# Patient Record
Sex: FEMALE | Race: OTHER | HISPANIC OR LATINO
[De-identification: names, ages, dates, MRNs, and addresses within clinical notes are randomized per-mention and may not be internally consistent; named-entity substitution may affect disease eponyms.]

---

## 2017-08-11 ENCOUNTER — APPOINTMENT (OUTPATIENT)
Dept: INTERNAL MEDICINE | Facility: CLINIC | Age: 62
End: 2017-08-11
Payer: COMMERCIAL

## 2017-08-11 VITALS
DIASTOLIC BLOOD PRESSURE: 98 MMHG | BODY MASS INDEX: 32.99 KG/M2 | HEIGHT: 65 IN | TEMPERATURE: 98.6 F | RESPIRATION RATE: 12 BRPM | WEIGHT: 198 LBS | OXYGEN SATURATION: 97 % | SYSTOLIC BLOOD PRESSURE: 162 MMHG | HEART RATE: 80 BPM

## 2017-08-11 DIAGNOSIS — Z78.9 OTHER SPECIFIED HEALTH STATUS: ICD-10-CM

## 2017-08-11 PROCEDURE — 36415 COLL VENOUS BLD VENIPUNCTURE: CPT

## 2017-08-11 PROCEDURE — 99386 PREV VISIT NEW AGE 40-64: CPT | Mod: 25

## 2017-08-11 PROCEDURE — 93000 ELECTROCARDIOGRAM COMPLETE: CPT

## 2017-08-12 LAB
ALBUMIN SERPL ELPH-MCNC: 4.4 G/DL
ALP BLD-CCNC: 88 U/L
ALT SERPL-CCNC: 18 U/L
ANION GAP SERPL CALC-SCNC: 15 MMOL/L
APPEARANCE: CLEAR
AST SERPL-CCNC: 13 U/L
BASOPHILS # BLD AUTO: 0.03 K/UL
BASOPHILS NFR BLD AUTO: 0.6 %
BILIRUB SERPL-MCNC: 0.4 MG/DL
BILIRUBIN URINE: NEGATIVE
BLOOD URINE: NEGATIVE
BUN SERPL-MCNC: 15 MG/DL
CALCIUM SERPL-MCNC: 9.6 MG/DL
CHLORIDE SERPL-SCNC: 106 MMOL/L
CHOLEST SERPL-MCNC: 254 MG/DL
CHOLEST/HDLC SERPL: 2.6 RATIO
CO2 SERPL-SCNC: 22 MMOL/L
COLOR: YELLOW
CREAT SERPL-MCNC: 0.81 MG/DL
EOSINOPHIL # BLD AUTO: 0.09 K/UL
EOSINOPHIL NFR BLD AUTO: 1.8 %
GLUCOSE QUALITATIVE U: NORMAL MG/DL
GLUCOSE SERPL-MCNC: 92 MG/DL
HBA1C MFR BLD HPLC: 5.5 %
HCT VFR BLD CALC: 47.9 %
HDLC SERPL-MCNC: 97 MG/DL
HGB BLD-MCNC: 15.3 G/DL
IMM GRANULOCYTES NFR BLD AUTO: 0 %
KETONES URINE: NEGATIVE
LDLC SERPL CALC-MCNC: 136 MG/DL
LEUKOCYTE ESTERASE URINE: NEGATIVE
LYMPHOCYTES # BLD AUTO: 1.94 K/UL
LYMPHOCYTES NFR BLD AUTO: 38.5 %
MAN DIFF?: NORMAL
MCHC RBC-ENTMCNC: 29.7 PG
MCHC RBC-ENTMCNC: 31.9 GM/DL
MCV RBC AUTO: 92.8 FL
MONOCYTES # BLD AUTO: 0.24 K/UL
MONOCYTES NFR BLD AUTO: 4.8 %
NEUTROPHILS # BLD AUTO: 2.74 K/UL
NEUTROPHILS NFR BLD AUTO: 54.3 %
NITRITE URINE: NEGATIVE
PH URINE: 5.5
PLATELET # BLD AUTO: 219 K/UL
POTASSIUM SERPL-SCNC: 4.5 MMOL/L
PROT SERPL-MCNC: 7 G/DL
PROTEIN URINE: NEGATIVE MG/DL
RBC # BLD: 5.16 M/UL
RBC # FLD: 14 %
SODIUM SERPL-SCNC: 143 MMOL/L
SPECIFIC GRAVITY URINE: 1.02
TRIGL SERPL-MCNC: 104 MG/DL
TSH SERPL-ACNC: 1.51 UIU/ML
UROBILINOGEN URINE: NORMAL MG/DL
WBC # FLD AUTO: 5.04 K/UL

## 2017-08-14 ENCOUNTER — FORM ENCOUNTER (OUTPATIENT)
Age: 62
End: 2017-08-14

## 2017-08-15 ENCOUNTER — OUTPATIENT (OUTPATIENT)
Dept: OUTPATIENT SERVICES | Facility: HOSPITAL | Age: 62
LOS: 1 days | End: 2017-08-15
Payer: COMMERCIAL

## 2017-08-15 PROCEDURE — 72040 X-RAY EXAM NECK SPINE 2-3 VW: CPT

## 2017-08-15 PROCEDURE — 72040 X-RAY EXAM NECK SPINE 2-3 VW: CPT | Mod: 26

## 2017-08-29 ENCOUNTER — MESSAGE (OUTPATIENT)
Age: 62
End: 2017-08-29

## 2018-08-29 ENCOUNTER — APPOINTMENT (OUTPATIENT)
Dept: INTERNAL MEDICINE | Facility: CLINIC | Age: 63
End: 2018-08-29
Payer: COMMERCIAL

## 2018-08-29 VITALS
OXYGEN SATURATION: 97 % | WEIGHT: 200 LBS | HEART RATE: 74 BPM | TEMPERATURE: 98.9 F | BODY MASS INDEX: 33.32 KG/M2 | SYSTOLIC BLOOD PRESSURE: 140 MMHG | DIASTOLIC BLOOD PRESSURE: 70 MMHG | HEIGHT: 65 IN | RESPIRATION RATE: 12 BRPM

## 2018-08-29 DIAGNOSIS — M54.2 CERVICALGIA: ICD-10-CM

## 2018-08-29 DIAGNOSIS — Z87.09 PERSONAL HISTORY OF OTHER DISEASES OF THE RESPIRATORY SYSTEM: ICD-10-CM

## 2018-08-29 PROCEDURE — 36415 COLL VENOUS BLD VENIPUNCTURE: CPT

## 2018-08-29 PROCEDURE — 99396 PREV VISIT EST AGE 40-64: CPT | Mod: 25

## 2018-08-30 PROBLEM — M54.2 NECK PAIN: Status: ACTIVE | Noted: 2018-08-30

## 2018-08-30 LAB
ALBUMIN SERPL ELPH-MCNC: 4.6 G/DL
ALP BLD-CCNC: 83 U/L
ALT SERPL-CCNC: 20 U/L
ANION GAP SERPL CALC-SCNC: 14 MMOL/L
APPEARANCE: CLEAR
APTT BLD: 33.9 SEC
AST SERPL-CCNC: 17 U/L
BASOPHILS # BLD AUTO: 0.03 K/UL
BASOPHILS NFR BLD AUTO: 0.5 %
BILIRUB SERPL-MCNC: 0.5 MG/DL
BILIRUBIN URINE: NEGATIVE
BLOOD URINE: NEGATIVE
BUN SERPL-MCNC: 16 MG/DL
CALCIUM SERPL-MCNC: 9.5 MG/DL
CHLORIDE SERPL-SCNC: 107 MMOL/L
CHOLEST SERPL-MCNC: 237 MG/DL
CHOLEST/HDLC SERPL: 2.5 RATIO
CO2 SERPL-SCNC: 23 MMOL/L
COLOR: YELLOW
CREAT SERPL-MCNC: 0.78 MG/DL
EOSINOPHIL # BLD AUTO: 0.08 K/UL
EOSINOPHIL NFR BLD AUTO: 1.4 %
GLUCOSE QUALITATIVE U: NEGATIVE MG/DL
GLUCOSE SERPL-MCNC: 94 MG/DL
HBA1C MFR BLD HPLC: 5.5 %
HCT VFR BLD CALC: 50 %
HDLC SERPL-MCNC: 93 MG/DL
HGB BLD-MCNC: 15.9 G/DL
IMM GRANULOCYTES NFR BLD AUTO: 0.2 %
INR PPP: 0.96 RATIO
KETONES URINE: NEGATIVE
LDLC SERPL CALC-MCNC: 122 MG/DL
LEUKOCYTE ESTERASE URINE: NEGATIVE
LYMPHOCYTES # BLD AUTO: 2.03 K/UL
LYMPHOCYTES NFR BLD AUTO: 34.5 %
MAN DIFF?: NORMAL
MCHC RBC-ENTMCNC: 30.2 PG
MCHC RBC-ENTMCNC: 31.8 GM/DL
MCV RBC AUTO: 95.1 FL
MONOCYTES # BLD AUTO: 0.28 K/UL
MONOCYTES NFR BLD AUTO: 4.8 %
NEUTROPHILS # BLD AUTO: 3.46 K/UL
NEUTROPHILS NFR BLD AUTO: 58.6 %
NITRITE URINE: NEGATIVE
PH URINE: 5
PLATELET # BLD AUTO: 231 K/UL
POTASSIUM SERPL-SCNC: 4.5 MMOL/L
PROT SERPL-MCNC: 7.1 G/DL
PROTEIN URINE: NEGATIVE MG/DL
PT BLD: 10.8 SEC
RBC # BLD: 5.26 M/UL
RBC # FLD: 14.3 %
SODIUM SERPL-SCNC: 144 MMOL/L
SPECIFIC GRAVITY URINE: 1.02
TRIGL SERPL-MCNC: 112 MG/DL
TSH SERPL-ACNC: 1.51 UIU/ML
UROBILINOGEN URINE: NEGATIVE MG/DL
WBC # FLD AUTO: 5.89 K/UL

## 2018-08-30 NOTE — HISTORY OF PRESENT ILLNESS
[FreeTextEntry1] : wellness [de-identified] : 7 questions\par 1- sore throat- for 3-4 months food is getting stuck.\par 2- shoulder and neck are inflamed - and getting headaches every day.\par 3- leg swelling-  comes and goes\par 4- concerned about inflammation at her neck.  \par 5- [place] and it is still hurting. - has not been to a dentist here.\par 6- She has eye issues little black spots floating\par 7- has random bruising on her body\par 8-pain on heel right sisde.

## 2018-08-30 NOTE — PHYSICAL EXAM
[No Acute Distress] : no acute distress [Well Nourished] : well nourished [Well Developed] : well developed [Well-Appearing] : well-appearing [Normal Sclera/Conjunctiva] : normal sclera/conjunctiva [PERRL] : pupils equal round and reactive to light [EOMI] : extraocular movements intact [Normal Outer Ear/Nose] : the outer ears and nose were normal in appearance [Normal Oropharynx] : the oropharynx was normal [No JVD] : no jugular venous distention [Supple] : supple [No Lymphadenopathy] : no lymphadenopathy [Thyroid Normal, No Nodules] : the thyroid was normal and there were no nodules present [No Respiratory Distress] : no respiratory distress  [Clear to Auscultation] : lungs were clear to auscultation bilaterally [No Accessory Muscle Use] : no accessory muscle use [Normal Rate] : normal rate  [Regular Rhythm] : with a regular rhythm [Normal S1, S2] : normal S1 and S2 [No Murmur] : no murmur heard [No Carotid Bruits] : no carotid bruits [Soft] : abdomen soft [Non Tender] : non-tender [Non-distended] : non-distended [No Masses] : no abdominal mass palpated [No HSM] : no HSM [Normal Bowel Sounds] : normal bowel sounds [Normal Posterior Cervical Nodes] : no posterior cervical lymphadenopathy [Normal Anterior Cervical Nodes] : no anterior cervical lymphadenopathy [No CVA Tenderness] : no CVA  tenderness [No Spinal Tenderness] : no spinal tenderness [No Joint Swelling] : no joint swelling [Grossly Normal Strength/Tone] : grossly normal strength/tone [No Rash] : no rash [Normal Gait] : normal gait [Coordination Grossly Intact] : coordination grossly intact [No Focal Deficits] : no focal deficits [Deep Tendon Reflexes (DTR)] : deep tendon reflexes were 2+ and symmetric [Normal Affect] : the affect was normal [Normal Insight/Judgement] : insight and judgment were intact [de-identified] : trace edema b/l feet

## 2018-08-30 NOTE — ASSESSMENT
[FreeTextEntry1] : BP - elevated today - f/up 4 weeks to repeat, continue lisinopril 10mg for now\par Swallowing discomfort- send for ENT eval\par Neck pain / shoulder pain - muscular - warm compress and stretching and massage - PT if no sig improvement\par Ecchymoses- check cbc pt,ptt with labs today\par f/up one month - \par

## 2018-09-07 ENCOUNTER — APPOINTMENT (OUTPATIENT)
Dept: HEART AND VASCULAR | Facility: CLINIC | Age: 63
End: 2018-09-07
Payer: COMMERCIAL

## 2018-09-07 VITALS
HEART RATE: 76 BPM | BODY MASS INDEX: 33.32 KG/M2 | DIASTOLIC BLOOD PRESSURE: 90 MMHG | SYSTOLIC BLOOD PRESSURE: 180 MMHG | HEIGHT: 65 IN | WEIGHT: 200 LBS | OXYGEN SATURATION: 97 % | RESPIRATION RATE: 12 BRPM | TEMPERATURE: 98.2 F

## 2018-09-07 DIAGNOSIS — R06.02 SHORTNESS OF BREATH: ICD-10-CM

## 2018-09-07 PROCEDURE — 93306 TTE W/DOPPLER COMPLETE: CPT

## 2018-09-07 PROCEDURE — 93015 CV STRESS TEST SUPVJ I&R: CPT

## 2018-09-07 PROCEDURE — 93880 EXTRACRANIAL BILAT STUDY: CPT

## 2018-09-07 PROCEDURE — 99214 OFFICE O/P EST MOD 30 MIN: CPT | Mod: 25

## 2018-09-13 ENCOUNTER — APPOINTMENT (OUTPATIENT)
Dept: OTOLARYNGOLOGY | Facility: CLINIC | Age: 63
End: 2018-09-13
Payer: COMMERCIAL

## 2018-09-13 ENCOUNTER — FORM ENCOUNTER (OUTPATIENT)
Age: 63
End: 2018-09-13

## 2018-09-13 VITALS
SYSTOLIC BLOOD PRESSURE: 161 MMHG | TEMPERATURE: 98.9 F | RESPIRATION RATE: 16 BRPM | OXYGEN SATURATION: 98 % | DIASTOLIC BLOOD PRESSURE: 71 MMHG | HEART RATE: 64 BPM

## 2018-09-13 VITALS — SYSTOLIC BLOOD PRESSURE: 160 MMHG | DIASTOLIC BLOOD PRESSURE: 100 MMHG

## 2018-09-13 DIAGNOSIS — J35.8 OTHER CHRONIC DISEASES OF TONSILS AND ADENOIDS: ICD-10-CM

## 2018-09-13 DIAGNOSIS — J35.1 HYPERTROPHY OF TONSILS: ICD-10-CM

## 2018-09-13 DIAGNOSIS — Z86.79 PERSONAL HISTORY OF OTHER DISEASES OF THE CIRCULATORY SYSTEM: ICD-10-CM

## 2018-09-13 DIAGNOSIS — R07.0 PAIN IN THROAT: ICD-10-CM

## 2018-09-13 DIAGNOSIS — R09.81 NASAL CONGESTION: ICD-10-CM

## 2018-09-13 DIAGNOSIS — Z78.9 OTHER SPECIFIED HEALTH STATUS: ICD-10-CM

## 2018-09-13 PROCEDURE — 99203 OFFICE O/P NEW LOW 30 MIN: CPT | Mod: 25

## 2018-09-13 PROCEDURE — 31575 DIAGNOSTIC LARYNGOSCOPY: CPT

## 2018-09-13 RX ORDER — OMEPRAZOLE 40 MG/1
40 CAPSULE, DELAYED RELEASE ORAL
Qty: 1 | Refills: 6 | Status: ACTIVE | COMMUNITY
Start: 2018-09-13 | End: 1900-01-01

## 2018-09-13 RX ORDER — METHYLPREDNISOLONE 4 MG/1
4 TABLET ORAL
Qty: 1 | Refills: 0 | Status: ACTIVE | COMMUNITY
Start: 2018-09-13 | End: 1900-01-01

## 2018-09-14 ENCOUNTER — APPOINTMENT (OUTPATIENT)
Dept: ULTRASOUND IMAGING | Facility: HOSPITAL | Age: 63
End: 2018-09-14
Payer: COMMERCIAL

## 2018-09-14 ENCOUNTER — OUTPATIENT (OUTPATIENT)
Dept: OUTPATIENT SERVICES | Facility: HOSPITAL | Age: 63
LOS: 1 days | End: 2018-09-14
Payer: COMMERCIAL

## 2018-09-14 PROCEDURE — 76536 US EXAM OF HEAD AND NECK: CPT | Mod: 26

## 2018-09-14 PROCEDURE — 76536 US EXAM OF HEAD AND NECK: CPT

## 2018-10-10 ENCOUNTER — APPOINTMENT (OUTPATIENT)
Dept: INTERNAL MEDICINE | Facility: CLINIC | Age: 63
End: 2018-10-10
Payer: COMMERCIAL

## 2018-10-10 VITALS
HEART RATE: 65 BPM | OXYGEN SATURATION: 97 % | DIASTOLIC BLOOD PRESSURE: 115 MMHG | SYSTOLIC BLOOD PRESSURE: 170 MMHG | RESPIRATION RATE: 16 BRPM | TEMPERATURE: 99 F

## 2018-10-10 PROCEDURE — 99214 OFFICE O/P EST MOD 30 MIN: CPT

## 2018-10-10 NOTE — PLAN
[FreeTextEntry1] : change to lis20/hctz 25mg and f/up in 2-3 weeks for b check and labs\par GERD- continue PPI for one month and monitor symptoms continue to avoid highly acidic foods.    if symptoms persist may need PPI for a longer peiod of time\par

## 2018-10-10 NOTE — HISTORY OF PRESENT ILLNESS
[FreeTextEntry1] : elevtaed BP [de-identified] : She is s/p eval from Dr Lai - (no findings and Dr Wong - given solumedrol for inflammation and PPI, \par \par

## 2018-10-10 NOTE — REVIEW OF SYSTEMS
[Nasal Discharge] : nasal discharge [Heartburn] : heartburn [Negative] : Psychiatric [FreeTextEntry9] : opccaisonal neck pain

## 2018-11-07 ENCOUNTER — APPOINTMENT (OUTPATIENT)
Dept: INTERNAL MEDICINE | Facility: CLINIC | Age: 63
End: 2018-11-07
Payer: COMMERCIAL

## 2018-11-07 ENCOUNTER — LABORATORY RESULT (OUTPATIENT)
Age: 63
End: 2018-11-07

## 2018-11-07 VITALS
SYSTOLIC BLOOD PRESSURE: 120 MMHG | TEMPERATURE: 98.6 F | BODY MASS INDEX: 32.65 KG/M2 | RESPIRATION RATE: 14 BRPM | HEART RATE: 71 BPM | DIASTOLIC BLOOD PRESSURE: 80 MMHG | WEIGHT: 196 LBS | HEIGHT: 65 IN | OXYGEN SATURATION: 96 %

## 2018-11-07 DIAGNOSIS — A60.00 HERPESVIRAL INFECTION OF UROGENITAL SYSTEM, UNSPECIFIED: ICD-10-CM

## 2018-11-07 PROCEDURE — 36415 COLL VENOUS BLD VENIPUNCTURE: CPT

## 2018-11-07 PROCEDURE — 99214 OFFICE O/P EST MOD 30 MIN: CPT

## 2018-11-07 RX ORDER — VALACYCLOVIR 500 MG/1
500 TABLET, FILM COATED ORAL
Qty: 90 | Refills: 3 | Status: ACTIVE | COMMUNITY
Start: 2017-08-11 | End: 1900-01-01

## 2018-11-07 NOTE — PLAN
[FreeTextEntry1] : HTN much better controlled\par \par continue cureent medication regimen\par  check labs with thiazide\par \par HSV - refill valacyclovir and also send for ointment to see if it helps\par \par refused flu shot\par \par

## 2018-11-07 NOTE — HISTORY OF PRESENT ILLNESS
[FreeTextEntry1] : bp fllow up [de-identified] : Tkaing the lis/hctz  and tolerating it well.  \par \par Would like refill of valacyclovir for hsv outbreaks

## 2018-11-08 LAB
ANION GAP SERPL CALC-SCNC: 15 MMOL/L
BUN SERPL-MCNC: 29 MG/DL
CALCIUM SERPL-MCNC: 9.8 MG/DL
CHLORIDE SERPL-SCNC: 104 MMOL/L
CO2 SERPL-SCNC: 25 MMOL/L
CREAT SERPL-MCNC: 0.98 MG/DL
GLUCOSE SERPL-MCNC: 97 MG/DL
MAGNESIUM SERPL-MCNC: 2.1 MG/DL
POTASSIUM SERPL-SCNC: 3.8 MMOL/L
SODIUM SERPL-SCNC: 144 MMOL/L

## 2018-12-09 ENCOUNTER — FORM ENCOUNTER (OUTPATIENT)
Age: 63
End: 2018-12-09

## 2018-12-10 ENCOUNTER — APPOINTMENT (OUTPATIENT)
Dept: INTERNAL MEDICINE | Facility: CLINIC | Age: 63
End: 2018-12-10
Payer: COMMERCIAL

## 2018-12-10 ENCOUNTER — OUTPATIENT (OUTPATIENT)
Dept: OUTPATIENT SERVICES | Facility: HOSPITAL | Age: 63
LOS: 1 days | End: 2018-12-10
Payer: COMMERCIAL

## 2018-12-10 VITALS
TEMPERATURE: 97.8 F | DIASTOLIC BLOOD PRESSURE: 78 MMHG | BODY MASS INDEX: 32.45 KG/M2 | SYSTOLIC BLOOD PRESSURE: 120 MMHG | WEIGHT: 195 LBS | OXYGEN SATURATION: 96 % | HEART RATE: 80 BPM | RESPIRATION RATE: 14 BRPM

## 2018-12-10 DIAGNOSIS — R23.3 SPONTANEOUS ECCHYMOSES: ICD-10-CM

## 2018-12-10 PROCEDURE — 73562 X-RAY EXAM OF KNEE 3: CPT | Mod: 26,LT

## 2018-12-10 PROCEDURE — 99214 OFFICE O/P EST MOD 30 MIN: CPT

## 2018-12-10 PROCEDURE — 36415 COLL VENOUS BLD VENIPUNCTURE: CPT

## 2018-12-10 PROCEDURE — 73562 X-RAY EXAM OF KNEE 3: CPT

## 2018-12-10 RX ORDER — DICLOFENAC SODIUM 50 MG/1
50 TABLET, DELAYED RELEASE ORAL
Qty: 10 | Refills: 3 | Status: ACTIVE | COMMUNITY
Start: 2018-12-10 | End: 1900-01-01

## 2018-12-10 NOTE — HISTORY OF PRESENT ILLNESS
[FreeTextEntry1] : left knee pain [de-identified] : having left knee pain for one week.  Severe.  \par \par Also recently had surgery in her mouth  right side tooth  - now with bruising - has been 2 weeks  \par \par Also with a rash over left knee for 2+ weeks, not itching

## 2018-12-10 NOTE — PLAN
[FreeTextEntry1] : \par \par Knee pain send for xray - and pending results sadaf need ortho eval\par \par petechial rash - etiology unclear- check a CCB\par \par \par

## 2018-12-10 NOTE — PHYSICAL EXAM
[No Acute Distress] : no acute distress [Well Nourished] : well nourished [Well Developed] : well developed [de-identified] : mild left knee td, over tib [de-identified] : non blacnhing petechial rash

## 2018-12-11 LAB
BASOPHILS # BLD AUTO: 0.05 K/UL
BASOPHILS NFR BLD AUTO: 0.8 %
EOSINOPHIL # BLD AUTO: 0.09 K/UL
EOSINOPHIL NFR BLD AUTO: 1.4 %
HCT VFR BLD CALC: 47.1 %
HGB BLD-MCNC: 14.9 G/DL
IMM GRANULOCYTES NFR BLD AUTO: 0.2 %
LYMPHOCYTES # BLD AUTO: 2.04 K/UL
LYMPHOCYTES NFR BLD AUTO: 30.9 %
MAN DIFF?: NORMAL
MCHC RBC-ENTMCNC: 30.2 PG
MCHC RBC-ENTMCNC: 31.6 GM/DL
MCV RBC AUTO: 95.5 FL
MONOCYTES # BLD AUTO: 0.25 K/UL
MONOCYTES NFR BLD AUTO: 3.8 %
NEUTROPHILS # BLD AUTO: 4.16 K/UL
NEUTROPHILS NFR BLD AUTO: 62.9 %
PLATELET # BLD AUTO: 265 K/UL
RBC # BLD: 4.93 M/UL
RBC # FLD: 14.3 %
WBC # FLD AUTO: 6.6 K/UL

## 2018-12-18 ENCOUNTER — APPOINTMENT (OUTPATIENT)
Dept: ORTHOPEDIC SURGERY | Facility: CLINIC | Age: 63
End: 2018-12-18
Payer: COMMERCIAL

## 2018-12-18 PROCEDURE — 99203 OFFICE O/P NEW LOW 30 MIN: CPT

## 2018-12-18 RX ORDER — MELOXICAM 15 MG/1
15 TABLET ORAL
Qty: 30 | Refills: 2 | Status: ACTIVE | COMMUNITY
Start: 2018-12-18 | End: 1900-01-01

## 2019-01-09 ENCOUNTER — APPOINTMENT (OUTPATIENT)
Dept: ORTHOPEDIC SURGERY | Facility: CLINIC | Age: 64
End: 2019-01-09
Payer: COMMERCIAL

## 2019-01-09 PROCEDURE — 99213 OFFICE O/P EST LOW 20 MIN: CPT

## 2019-01-09 NOTE — ASSESSMENT
[FreeTextEntry1] : Assessment\par #1 internal derangement left knee, medial meniscus tear, osteochondral defect femoral trochlea.\par \par Plan\par #1 for this patient who has severe pain with walking and sleeve along with mechanical symptoms we are recommending surgical intervention to include an arthroscopy and OATS procedure. We would recommend arthroscopy initially to address the meniscus tear and visualized the joint and articular surfaces, if deemed necessary at that point then we would convert to a mini-open procedure with an OATS to address the cartilage defect. Explained to the patient that she does not necessary have to do the procedure, but this may lead to increased pain and advanced early arthritis, this procedure may help to delay the onset of arthritis. Risks benefits alternatives clearly explained in detail. Patient will discuss with her family and call us to let us know she wishes to proceed.

## 2019-01-09 NOTE — PHYSICAL EXAM
[de-identified] : Focused examinatino of left knee: Red superficial abrasion over the proximal anterior tibia. Mildly swollen. Tender laterally. ROM 0-125. AP stable, V/V lax, (+) Esther Test. (-) Lochman test. Ambulates with normal gait without devices. [de-identified] : MRI from [facility] shows a horizontal nondisplaced tear of the medial meniscus and a small osteochondral defect of the femoral trochlea, there are mild degenerative changes of the medial and patellofemoral compartment.

## 2019-01-09 NOTE — HISTORY OF PRESENT ILLNESS
[de-identified] : Following up for MRI results, has had an overall very slight decrease in her pain, but her pain continues to be severe when she is walking and sleeping. Mechanical symptoms remain present. No other complaints or changes in her status at this time.

## 2019-01-25 ENCOUNTER — APPOINTMENT (OUTPATIENT)
Dept: INTERNAL MEDICINE | Facility: CLINIC | Age: 64
End: 2019-01-25
Payer: COMMERCIAL

## 2019-01-25 VITALS
SYSTOLIC BLOOD PRESSURE: 140 MMHG | WEIGHT: 195 LBS | OXYGEN SATURATION: 98 % | HEART RATE: 78 BPM | HEIGHT: 65 IN | DIASTOLIC BLOOD PRESSURE: 80 MMHG | BODY MASS INDEX: 32.49 KG/M2 | RESPIRATION RATE: 14 BRPM | TEMPERATURE: 98.4 F

## 2019-01-25 DIAGNOSIS — K21.9 GASTRO-ESOPHAGEAL REFLUX DISEASE W/OUT ESOPHAGITIS: ICD-10-CM

## 2019-01-25 DIAGNOSIS — Z01.818 ENCOUNTER FOR OTHER PREPROCEDURAL EXAMINATION: ICD-10-CM

## 2019-01-25 PROCEDURE — 99214 OFFICE O/P EST MOD 30 MIN: CPT

## 2019-01-25 PROCEDURE — 36415 COLL VENOUS BLD VENIPUNCTURE: CPT

## 2019-01-25 PROCEDURE — 93000 ELECTROCARDIOGRAM COMPLETE: CPT

## 2019-01-27 PROBLEM — K21.9 CHRONIC GERD: Status: ACTIVE | Noted: 2018-09-13

## 2019-01-28 PROBLEM — Z01.818 PRE-OP EXAM: Status: ACTIVE | Noted: 2019-01-28

## 2019-01-28 LAB
ALBUMIN SERPL ELPH-MCNC: 4.5 G/DL
ALP BLD-CCNC: 76 U/L
ALT SERPL-CCNC: 24 U/L
ANION GAP SERPL CALC-SCNC: 14 MMOL/L
APPEARANCE: CLEAR
APTT BLD: 32.4 SEC
AST SERPL-CCNC: 19 U/L
BASOPHILS # BLD AUTO: 0.03 K/UL
BASOPHILS NFR BLD AUTO: 0.5 %
BILIRUB SERPL-MCNC: 0.4 MG/DL
BILIRUBIN URINE: NEGATIVE
BLOOD URINE: NEGATIVE
BUN SERPL-MCNC: 20 MG/DL
CALCIUM SERPL-MCNC: 9.4 MG/DL
CHLORIDE SERPL-SCNC: 104 MMOL/L
CO2 SERPL-SCNC: 22 MMOL/L
COLOR: YELLOW
CREAT SERPL-MCNC: 0.75 MG/DL
EOSINOPHIL # BLD AUTO: 0.12 K/UL
EOSINOPHIL NFR BLD AUTO: 1.9 %
GLUCOSE QUALITATIVE U: NEGATIVE MG/DL
GLUCOSE SERPL-MCNC: 98 MG/DL
HCT VFR BLD CALC: 45.9 %
HGB BLD-MCNC: 14.5 G/DL
IMM GRANULOCYTES NFR BLD AUTO: 0.2 %
INR PPP: 0.89 RATIO
KETONES URINE: NEGATIVE
LEUKOCYTE ESTERASE URINE: NEGATIVE
LYMPHOCYTES # BLD AUTO: 2.12 K/UL
LYMPHOCYTES NFR BLD AUTO: 33.7 %
MAN DIFF?: NORMAL
MCHC RBC-ENTMCNC: 30.1 PG
MCHC RBC-ENTMCNC: 31.6 GM/DL
MCV RBC AUTO: 95.2 FL
MONOCYTES # BLD AUTO: 0.26 K/UL
MONOCYTES NFR BLD AUTO: 4.1 %
NEUTROPHILS # BLD AUTO: 3.75 K/UL
NEUTROPHILS NFR BLD AUTO: 59.6 %
NITRITE URINE: NEGATIVE
PH URINE: 5.5
PLATELET # BLD AUTO: 240 K/UL
POTASSIUM SERPL-SCNC: 4.1 MMOL/L
PROT SERPL-MCNC: 6.9 G/DL
PROTEIN URINE: NEGATIVE MG/DL
PT BLD: 9.9 SEC
RBC # BLD: 4.82 M/UL
RBC # FLD: 14.1 %
SODIUM SERPL-SCNC: 140 MMOL/L
SPECIFIC GRAVITY URINE: 1.02
UROBILINOGEN URINE: NEGATIVE MG/DL
WBC # FLD AUTO: 6.29 K/UL

## 2019-01-28 NOTE — HISTORY OF PRESENT ILLNESS
[No Pertinent Cardiac History] : no history of aortic stenosis, atrial fibrillation, coronary artery disease, recent myocardial infarction, or implantable device/pacemaker [No Pertinent Pulmonary History] : no history of asthma, COPD, sleep apnea, or smoking [No Adverse Anesthesia Reaction] : no adverse anesthesia reaction in self or family member [FreeTextEntry1] : Arthroscopy and OATS left knee [FreeTextEntry2] : 2/7/19 [FreeTextEntry3] : Dr Kenney [FreeTextEntry4] : 62 yo f with gerd and htn with severe left knee pain

## 2019-01-28 NOTE — PHYSICAL EXAM
[No Acute Distress] : no acute distress [Well Nourished] : well nourished [Well Developed] : well developed [Normal Outer Ear/Nose] : the outer ears and nose were normal in appearance [Normal Oropharynx] : the oropharynx was normal [No JVD] : no jugular venous distention [Supple] : supple [No Lymphadenopathy] : no lymphadenopathy [No Respiratory Distress] : no respiratory distress  [Clear to Auscultation] : lungs were clear to auscultation bilaterally [No Accessory Muscle Use] : no accessory muscle use [Normal Rate] : normal rate  [Regular Rhythm] : with a regular rhythm [Normal S1, S2] : normal S1 and S2 [No Edema] : there was no peripheral edema [Normal Affect] : the affect was normal [Normal Insight/Judgement] : insight and judgment were intact [de-identified] : mild left knee td, over tib [de-identified] : non blacnhing petechial rash

## 2019-02-06 NOTE — ASU PATIENT PROFILE, ADULT - PSH
No significant past surgical history Elective surgery  gum surgery  H/O bilateral breast reduction surgery    H/O:  section  once

## 2019-02-07 ENCOUNTER — APPOINTMENT (OUTPATIENT)
Dept: ORTHOPEDIC SURGERY | Facility: HOSPITAL | Age: 64
End: 2019-02-07
Payer: COMMERCIAL

## 2019-02-07 ENCOUNTER — OUTPATIENT (OUTPATIENT)
Dept: OUTPATIENT SERVICES | Facility: HOSPITAL | Age: 64
LOS: 1 days | Discharge: HOME | End: 2019-02-07
Payer: COMMERCIAL

## 2019-02-07 VITALS
TEMPERATURE: 99 F | HEART RATE: 75 BPM | SYSTOLIC BLOOD PRESSURE: 118 MMHG | HEIGHT: 63 IN | RESPIRATION RATE: 20 BRPM | WEIGHT: 171.3 LBS | DIASTOLIC BLOOD PRESSURE: 62 MMHG | OXYGEN SATURATION: 96 %

## 2019-02-07 VITALS
RESPIRATION RATE: 20 BRPM | TEMPERATURE: 97 F | SYSTOLIC BLOOD PRESSURE: 127 MMHG | OXYGEN SATURATION: 99 % | HEART RATE: 65 BPM | DIASTOLIC BLOOD PRESSURE: 65 MMHG

## 2019-02-07 DIAGNOSIS — Z98.890 OTHER SPECIFIED POSTPROCEDURAL STATES: Chronic | ICD-10-CM

## 2019-02-07 DIAGNOSIS — Z98.891 HISTORY OF UTERINE SCAR FROM PREVIOUS SURGERY: Chronic | ICD-10-CM

## 2019-02-07 DIAGNOSIS — Z41.9 ENCOUNTER FOR PROCEDURE FOR PURPOSES OTHER THAN REMEDYING HEALTH STATE, UNSPECIFIED: Chronic | ICD-10-CM

## 2019-02-07 PROCEDURE — 29881 ARTHRS KNE SRG MNISECTMY M/L: CPT | Mod: LT

## 2019-02-07 RX ORDER — OXYCODONE HYDROCHLORIDE 5 MG/1
5 TABLET ORAL EVERY 6 HOURS
Qty: 0 | Refills: 0 | Status: DISCONTINUED | OUTPATIENT
Start: 2019-02-07 | End: 2019-02-07

## 2019-02-07 RX ORDER — HYDROMORPHONE HYDROCHLORIDE 2 MG/ML
0.5 INJECTION INTRAMUSCULAR; INTRAVENOUS; SUBCUTANEOUS
Qty: 0 | Refills: 0 | Status: DISCONTINUED | OUTPATIENT
Start: 2019-02-07 | End: 2019-02-07

## 2019-02-07 RX ORDER — ASPIRIN/CALCIUM CARB/MAGNESIUM 324 MG
1 TABLET ORAL
Qty: 28 | Refills: 0 | OUTPATIENT
Start: 2019-02-07 | End: 2019-02-20

## 2019-02-07 NOTE — BRIEF OPERATIVE NOTE - PROCEDURE
<<-----Click on this checkbox to enter Procedure Meniscectomy of left knee  02/07/2019    Active  SHAJI

## 2019-02-07 NOTE — BRIEF OPERATIVE NOTE - PRE-OP DX
Old tear of meniscus of left knee, unspecified meniscus, unspecified tear type  02/07/2019    Active  Starr Brannon

## 2019-02-12 PROBLEM — M23.90 UNSPECIFIED INTERNAL DERANGEMENT OF UNSPECIFIED KNEE: Chronic | Status: ACTIVE | Noted: 2019-02-06

## 2019-02-12 PROBLEM — I10 ESSENTIAL (PRIMARY) HYPERTENSION: Chronic | Status: ACTIVE | Noted: 2019-02-06

## 2019-02-19 ENCOUNTER — APPOINTMENT (OUTPATIENT)
Dept: ORTHOPEDIC SURGERY | Facility: CLINIC | Age: 64
End: 2019-02-19
Payer: COMMERCIAL

## 2019-02-19 VITALS — BODY MASS INDEX: 32.49 KG/M2 | WEIGHT: 195 LBS | HEIGHT: 65 IN

## 2019-02-19 PROCEDURE — 99024 POSTOP FOLLOW-UP VISIT: CPT

## 2019-03-01 NOTE — HISTORY OF PRESENT ILLNESS
[de-identified] : s/p L knee scope and partial meniscectomy 2/7/19 [de-identified] : Doing well at home. No pain. No portal site issues. Eager to resume work. [de-identified] : Portals well approximated, no sutures to remove, steristrips changed. ROM knee 0-100. No erythema/effusion. [de-identified] : 1st postop L knee scope\par - Cleared to return to work\par - RTC 1mo...

## 2019-03-27 ENCOUNTER — APPOINTMENT (OUTPATIENT)
Dept: ORTHOPEDIC SURGERY | Facility: CLINIC | Age: 64
End: 2019-03-27
Payer: COMMERCIAL

## 2019-03-27 PROCEDURE — 99024 POSTOP FOLLOW-UP VISIT: CPT

## 2019-03-28 VITALS — BODY MASS INDEX: 32.49 KG/M2 | WEIGHT: 195 LBS | HEIGHT: 65 IN

## 2019-03-29 NOTE — ASSESSMENT
[FreeTextEntry1] : Assessment\par #1 status post left knee arthroscopy\par \par Plan\par #1 patient has not begun physical therapy as instructed at last visit, we demonstrated the abdomen static isometric exercises to show the patient just how weak her knee was, now she seems to understand the importance of physical therapy and will be doing these at-home exercises 3 times a week very diligently. Due to her work restrictions she is not able to attend formal outpatient physical therapy so the importance of at-home exercises were strongly stressed.

## 2019-03-29 NOTE — HISTORY OF PRESENT ILLNESS
[de-identified] : Following up after a left knee arthroscopy, overall pain and mechanical symptoms are decreased, continues to have band like tightness around the proximal tibia. No complaints at this time.

## 2019-03-29 NOTE — PHYSICAL EXAM
[de-identified] : General: Not in acute distress, dressed appropriately, sitting on examination table\par Skin: Warm and dry, normal turgor, no rashes\par Neurological: AOx3, Cranial nerves grossly in tact\par       Psych: Mood and affect appropriate\par \par Focused exam left Knee: No swelling edema erythema redness or drainage. Neutral Alignment. Tender anteriorly and medially. ROM: 0-125. Stable to AP and varus valgus stress. Special maneuver is negative. 5/5 strength. Ambulates with no devices.

## 2019-05-07 ENCOUNTER — FORM ENCOUNTER (OUTPATIENT)
Age: 64
End: 2019-05-07

## 2019-05-08 ENCOUNTER — APPOINTMENT (OUTPATIENT)
Dept: ORTHOPEDIC SURGERY | Facility: CLINIC | Age: 64
End: 2019-05-08
Payer: COMMERCIAL

## 2019-05-08 ENCOUNTER — OUTPATIENT (OUTPATIENT)
Dept: OUTPATIENT SERVICES | Facility: HOSPITAL | Age: 64
LOS: 1 days | End: 2019-05-08
Payer: COMMERCIAL

## 2019-05-08 DIAGNOSIS — Z98.890 OTHER SPECIFIED POSTPROCEDURAL STATES: Chronic | ICD-10-CM

## 2019-05-08 DIAGNOSIS — Z98.891 HISTORY OF UTERINE SCAR FROM PREVIOUS SURGERY: Chronic | ICD-10-CM

## 2019-05-08 DIAGNOSIS — Z41.9 ENCOUNTER FOR PROCEDURE FOR PURPOSES OTHER THAN REMEDYING HEALTH STATE, UNSPECIFIED: Chronic | ICD-10-CM

## 2019-05-08 PROCEDURE — 73562 X-RAY EXAM OF KNEE 3: CPT | Mod: 26,LT

## 2019-05-08 PROCEDURE — 99024 POSTOP FOLLOW-UP VISIT: CPT

## 2019-05-08 PROCEDURE — 73562 X-RAY EXAM OF KNEE 3: CPT

## 2019-05-16 NOTE — PHYSICAL EXAM
[de-identified] : General: Not in acute distress, dressed appropriately, sitting on examination table\par Skin: Warm and dry, normal turgor, no rashes\par Neurological: AOx3, Cranial nerves grossly in tact\par       Psych: Mood and affect appropriate\par \par Focused exam left Knee: No swelling edema erythema redness or drainage. Neutral Alignment. Tender anteriorly and medially. ROM: 0-125. Stable to AP and varus valgus stress. Special maneuver is negative. 5/5 strength. Ambulates with no devices.

## 2019-05-16 NOTE — ASSESSMENT
[FreeTextEntry1] : Assessment\par #1 status post left knee arthroscopy doing great\par \par Plan\par #1 followup p.r.n.\par #2 all activities as tolerated

## 2019-05-16 NOTE — HISTORY OF PRESENT ILLNESS
[de-identified] : 6 month followup after a left knee arthroscopy, her pain and mechanical symptoms are gone, she is very happy and has no complaints at this time.

## 2019-05-17 ENCOUNTER — APPOINTMENT (OUTPATIENT)
Dept: INTERNAL MEDICINE | Facility: CLINIC | Age: 64
End: 2019-05-17
Payer: COMMERCIAL

## 2019-05-17 VITALS
WEIGHT: 203 LBS | HEART RATE: 79 BPM | SYSTOLIC BLOOD PRESSURE: 144 MMHG | TEMPERATURE: 98.7 F | BODY MASS INDEX: 33.82 KG/M2 | DIASTOLIC BLOOD PRESSURE: 90 MMHG | OXYGEN SATURATION: 98 % | HEIGHT: 65 IN

## 2019-05-17 DIAGNOSIS — K64.4 RESIDUAL HEMORRHOIDAL SKIN TAGS: ICD-10-CM

## 2019-05-17 DIAGNOSIS — R25.2 CRAMP AND SPASM: ICD-10-CM

## 2019-05-17 PROCEDURE — 99214 OFFICE O/P EST MOD 30 MIN: CPT

## 2019-05-17 RX ORDER — LISINOPRIL 10 MG/1
10 TABLET ORAL DAILY
Qty: 90 | Refills: 3 | Status: ACTIVE | COMMUNITY
Start: 2017-08-11 | End: 1900-01-01

## 2019-05-17 RX ORDER — CYCLOBENZAPRINE HYDROCHLORIDE 5 MG/1
5 TABLET, FILM COATED ORAL 3 TIMES DAILY
Qty: 15 | Refills: 3 | Status: ACTIVE | COMMUNITY
Start: 2019-05-17 | End: 1900-01-01

## 2019-05-19 PROBLEM — K64.4 EXTERNAL HEMORRHOIDS: Status: ACTIVE | Noted: 2019-05-19

## 2019-05-19 NOTE — HISTORY OF PRESENT ILLNESS
[de-identified] : 2 months ago, small amount of blood with wiping after BM.    No blood in toilet.    Denies any stomach pain.    Has occurred a couple times since then.  Concern for hemorrhod.  Colonoscopy in 2015 was normal.\par \par Also with occasional leg cramps at night.  right sided.   onlt at night  lasts 2-3 minutes. \par  -  \par

## 2019-05-19 NOTE — REVIEW OF SYSTEMS
[Abdominal Pain] : no abdominal pain [Nausea] : no nausea [Constipation] : no constipation [Diarrhea] : diarrhea [Melena] : no melena [Heartburn] : no heartburn [Vomiting] : no vomiting [Muscle Pain] : muscle pain [Negative] : Genitourinary [FreeTextEntry7] : BRBPR

## 2019-05-19 NOTE — PHYSICAL EXAM
[No Acute Distress] : no acute distress [Well Nourished] : well nourished [Well Developed] : well developed [Normal Affect] : the affect was normal [No Joint Swelling] : no joint swelling [Normal Insight/Judgement] : insight and judgment were intact [FreeTextEntry1] : 2 large hemorrhoids present not thrombosed [de-identified] : no calf tenderness

## 2019-05-19 NOTE — PLAN
[FreeTextEntry1] : Ext hemorrhoids\par - high fiber diet\par - prep H\par - Witch hazel wipes\par - GI referral\par \par Leg cramps\par - trial of mag oxide nightly\par - Also rx for cyclobenzaprine as it has helped her in the past, but advise not taking it nightly\par

## 2019-06-05 ENCOUNTER — APPOINTMENT (OUTPATIENT)
Dept: ORTHOPEDIC SURGERY | Facility: CLINIC | Age: 64
End: 2019-06-05
Payer: COMMERCIAL

## 2019-06-05 VITALS — HEIGHT: 65 IN | BODY MASS INDEX: 33.82 KG/M2 | WEIGHT: 203 LBS | HEART RATE: 85 BPM | OXYGEN SATURATION: 98 %

## 2019-06-05 DIAGNOSIS — M23.92 UNSPECIFIED INTERNAL DERANGEMENT OF LEFT KNEE: ICD-10-CM

## 2019-06-05 PROCEDURE — 99213 OFFICE O/P EST LOW 20 MIN: CPT

## 2019-06-10 PROBLEM — M23.92 INTERNAL DERANGEMENT OF LEFT KNEE: Status: ACTIVE | Noted: 2018-12-18

## 2019-06-10 NOTE — ASSESSMENT
[FreeTextEntry1] : Assessment: This is a 6 to female 4 months status post left knee arthroscopy and medial meniscectomy\par Plan: She states she may well. She can followup p.r.n.....

## 2019-06-10 NOTE — PHYSICAL EXAM
[de-identified] : Physical examination of the left knee demonstrates well-healed arthroscopy scars. She has 0-100° of flexion without any pain. She has no pain with the mediolateral joint lines. She is stable to varus valgus stress. He has negative Lachman's. The rest of the exam is benign.

## 2019-06-10 NOTE — REASON FOR VISIT
[Follow-Up Visit] : a follow-up visit for [FreeTextEntry2] : 4 months s/p left knee arthroscopy done 2/7/19

## 2019-11-14 ENCOUNTER — APPOINTMENT (OUTPATIENT)
Dept: INTERNAL MEDICINE | Facility: CLINIC | Age: 64
End: 2019-11-14
Payer: MEDICAID

## 2019-11-14 VITALS
OXYGEN SATURATION: 97 % | BODY MASS INDEX: 32.62 KG/M2 | SYSTOLIC BLOOD PRESSURE: 126 MMHG | TEMPERATURE: 97.7 F | DIASTOLIC BLOOD PRESSURE: 70 MMHG | WEIGHT: 196 LBS | HEART RATE: 68 BPM

## 2019-11-14 DIAGNOSIS — I10 ESSENTIAL (PRIMARY) HYPERTENSION: ICD-10-CM

## 2019-11-14 PROCEDURE — 93000 ELECTROCARDIOGRAM COMPLETE: CPT

## 2019-11-14 PROCEDURE — 99214 OFFICE O/P EST MOD 30 MIN: CPT

## 2019-11-14 PROCEDURE — 36415 COLL VENOUS BLD VENIPUNCTURE: CPT

## 2019-11-14 NOTE — PLAN
[FreeTextEntry1] : Atypical chest pain - low suspicion for cardiac issue - but needs further eval with questionable episode of amaurosis fugax- needs echo and carotid evaluation\par HTN controlled\par \par Will get flu shot next week

## 2019-11-14 NOTE — HISTORY OF PRESENT ILLNESS
[FreeTextEntry1] : chest pain [de-identified] : 3 weeks ago had cp for 3 hours head hurt, felt hot in her face.\par Feels thirsty a lot. \par Currently with head pain.  \par getting cramping left hand and arm and leg at times.\par [place], walking regularly\par \par One day could not se out of right eye - \par Mammo 3 months ago

## 2019-11-14 NOTE — REVIEW OF SYSTEMS
[Fatigue] : fatigue [Vision Problems] : vision problems [Chest Pain] : chest pain [Nasal Discharge] : nasal discharge [Muscle Pain] : muscle pain [Palpitations] : no palpitations [Negative] : Respiratory

## 2019-11-15 LAB
ALBUMIN SERPL ELPH-MCNC: 4.5 G/DL
ALP BLD-CCNC: 67 U/L
ALT SERPL-CCNC: 14 U/L
ANION GAP SERPL CALC-SCNC: 15 MMOL/L
AST SERPL-CCNC: 13 U/L
BASOPHILS # BLD AUTO: 0.04 K/UL
BASOPHILS NFR BLD AUTO: 0.6 %
BILIRUB SERPL-MCNC: 0.3 MG/DL
BUN SERPL-MCNC: 23 MG/DL
CALCIUM SERPL-MCNC: 10.1 MG/DL
CHLORIDE SERPL-SCNC: 102 MMOL/L
CHOLEST SERPL-MCNC: 266 MG/DL
CHOLEST/HDLC SERPL: 3.6 RATIO
CO2 SERPL-SCNC: 25 MMOL/L
CREAT SERPL-MCNC: 1.3 MG/DL
EOSINOPHIL # BLD AUTO: 0.08 K/UL
EOSINOPHIL NFR BLD AUTO: 1.2 %
ESTIMATED AVERAGE GLUCOSE: 111 MG/DL
GLUCOSE SERPL-MCNC: 100 MG/DL
HBA1C MFR BLD HPLC: 5.5 %
HCT VFR BLD CALC: 45.6 %
HDLC SERPL-MCNC: 75 MG/DL
HGB BLD-MCNC: 14.7 G/DL
IMM GRANULOCYTES NFR BLD AUTO: 0.1 %
LDLC SERPL CALC-MCNC: 159 MG/DL
LYMPHOCYTES # BLD AUTO: 2.18 K/UL
LYMPHOCYTES NFR BLD AUTO: 32.3 %
MAN DIFF?: NORMAL
MCHC RBC-ENTMCNC: 30.6 PG
MCHC RBC-ENTMCNC: 32.2 GM/DL
MCV RBC AUTO: 94.8 FL
MONOCYTES # BLD AUTO: 0.36 K/UL
MONOCYTES NFR BLD AUTO: 5.3 %
NEUTROPHILS # BLD AUTO: 4.08 K/UL
NEUTROPHILS NFR BLD AUTO: 60.5 %
PLATELET # BLD AUTO: 233 K/UL
POTASSIUM SERPL-SCNC: 4 MMOL/L
PROT SERPL-MCNC: 6.9 G/DL
RBC # BLD: 4.81 M/UL
RBC # FLD: 13.2 %
SODIUM SERPL-SCNC: 142 MMOL/L
TRIGL SERPL-MCNC: 161 MG/DL
TSH SERPL-ACNC: 1.81 UIU/ML
WBC # FLD AUTO: 6.75 K/UL

## 2019-11-18 ENCOUNTER — APPOINTMENT (OUTPATIENT)
Dept: HEART AND VASCULAR | Facility: CLINIC | Age: 64
End: 2019-11-18
Payer: MEDICAID

## 2019-11-18 ENCOUNTER — APPOINTMENT (OUTPATIENT)
Dept: HEART AND VASCULAR | Facility: CLINIC | Age: 64
End: 2019-11-18

## 2019-11-18 VITALS
SYSTOLIC BLOOD PRESSURE: 130 MMHG | HEIGHT: 65 IN | BODY MASS INDEX: 32.65 KG/M2 | HEART RATE: 78 BPM | OXYGEN SATURATION: 98 % | WEIGHT: 196 LBS | DIASTOLIC BLOOD PRESSURE: 74 MMHG | RESPIRATION RATE: 14 BRPM

## 2019-11-18 DIAGNOSIS — Z82.49 FAMILY HISTORY OF ISCHEMIC HEART DISEASE AND OTHER DISEASES OF THE CIRCULATORY SYSTEM: ICD-10-CM

## 2019-11-18 DIAGNOSIS — R07.9 CHEST PAIN, UNSPECIFIED: ICD-10-CM

## 2019-11-18 DIAGNOSIS — R07.89 OTHER CHEST PAIN: ICD-10-CM

## 2019-11-18 DIAGNOSIS — R09.89 OTHER SPECIFIED SYMPTOMS AND SIGNS INVOLVING THE CIRCULATORY AND RESPIRATORY SYSTEMS: ICD-10-CM

## 2019-11-18 DIAGNOSIS — E78.49 OTHER HYPERLIPIDEMIA: ICD-10-CM

## 2019-11-18 DIAGNOSIS — R06.09 OTHER FORMS OF DYSPNEA: ICD-10-CM

## 2019-11-18 DIAGNOSIS — R51 HEADACHE: ICD-10-CM

## 2019-11-18 DIAGNOSIS — I10 ESSENTIAL (PRIMARY) HYPERTENSION: ICD-10-CM

## 2019-11-18 PROCEDURE — 93306 TTE W/DOPPLER COMPLETE: CPT

## 2019-11-18 PROCEDURE — 93880 EXTRACRANIAL BILAT STUDY: CPT

## 2019-11-18 PROCEDURE — 99203 OFFICE O/P NEW LOW 30 MIN: CPT

## 2019-11-18 NOTE — HISTORY OF PRESENT ILLNESS
[FreeTextEntry1] : Patient also reports a recent transient loss of vision in the right eye . \par \par No hx of palpitations, arrhythmias, CHF , strokes or TIAs\par \par Denies dysphagia \par \par

## 2019-11-18 NOTE — REASON FOR VISIT
[Consultation] : a consultation regarding [Chest Pain] : chest pain [Hypertension] : hypertension [FreeTextEntry1] : 64 year old obese Armenian female with HTN presents for evaluation of  nonexertional chest pain that radiates down left arm . Last several minutes and is unassociated with eating , drinking.  She denies palpitations, cough, dyspnea , orthopnea. \par \par Has complaints of posterior headaches without nausea, vomiting , head trauma but admits to left sided tinnitus past few weeks.  No recent travel, cold, flu, fevers. \par \par Recent lab tests showed no diabetes . She does have hyperlipidemia  and normal thyroid function \par \par Recent EKG was normal

## 2019-11-18 NOTE — REVIEW OF SYSTEMS
[Headache] : headache [Chest Pain] : chest pain [Negative] : Heme/Lymph [FreeTextEntry1] : tinnitus left ear

## 2019-11-18 NOTE — DISCUSSION/SUMMARY
[Hyperlipidemia] : hyperlipidemia [Stable] : stable [Essential Hypertension] : essential hypertension [Diet Modification] : diet modification [None] : none [Responding to Treatment] : responding to treatment [de-identified] : advised to decrease carbohydrate intake  [With PCP] : with ~his/her~ primary care provider [de-identified] : should start a statin i.e.,  atorvastatin 20mg po qd  [FreeTextEntry1] : echocardiogram results are normal \par \par carotid duplex scan is normal  with bilateral antegrade vertebral flows \par \par headaches with unilateral tinnitus  and recent transient loss of vision / contralateral side is suspicious - advise referral to ENT with  neuroimaging to include evaluation for  acoustic neuroma or similar lesion

## 2019-11-18 NOTE — ASSESSMENT
[FreeTextEntry1] : Controlled HTN\par \par Familial hyperlipidemia \par \par obesity , BMI 33\par \par atypical chest pain \par \par left sided tinnitus, posterior headaches  and ? TIA syndrome

## 2019-11-18 NOTE — PHYSICAL EXAM
[General Appearance - Well Developed] : well developed [Normal Appearance] : normal appearance [Well Groomed] : well groomed [General Appearance - Well Nourished] : well nourished [No Deformities] : no deformities [Normal Conjunctiva] : the conjunctiva exhibited no abnormalities [General Appearance - In No Acute Distress] : no acute distress [FreeTextEntry1] : anicteric  [Eyelids - No Xanthelasma] : the eyelids demonstrated no xanthelasmas [No Oral Pallor] : no oral pallor [Normal Oral Mucosa] : normal oral mucosa [Normal Jugular Venous A Waves Present] : normal jugular venous A waves present [Normal Jugular Venous V Waves Present] : normal jugular venous V waves present [No Oral Cyanosis] : no oral cyanosis [Heart Rate And Rhythm] : heart rate and rhythm were normal [No Jugular Venous Rinaldi A Waves] : no jugular venous rinaldi A waves [Heart Sounds] : normal S1 and S2 [Murmurs] : no murmurs present [Edema] : no peripheral edema present [Respiration, Rhythm And Depth] : normal respiratory rhythm and effort [Exaggerated Use Of Accessory Muscles For Inspiration] : no accessory muscle use [Auscultation Breath Sounds / Voice Sounds] : lungs were clear to auscultation bilaterally [Abdomen Soft] : soft [Bowel Sounds] : normal bowel sounds [Abdomen Tenderness] : non-tender [Abdomen Mass (___ Cm)] : no abdominal mass palpated [Abnormal Walk] : normal gait [Gait - Sufficient For Exercise Testing] : the gait was sufficient for exercise testing [Nail Clubbing] : no clubbing of the fingernails [Cyanosis, Localized] : no localized cyanosis [Petechial Hemorrhages (___cm)] : no petechial hemorrhages [Skin Color & Pigmentation] : normal skin color and pigmentation [No Venous Stasis] : no venous stasis [Skin Turgor] : normal skin turgor [] : no rash [No Xanthoma] : no  xanthoma was observed [Skin Lesions] : no skin lesions [No Skin Ulcers] : no skin ulcer [Oriented To Time, Place, And Person] : oriented to person, place, and time [Impaired Insight] : insight and judgment were intact [Affect] : the affect was normal [Mood] : the mood was normal [No Anxiety] : not feeling anxious

## 2019-12-02 NOTE — PHYSICAL EXAM
[No Acute Distress] : no acute distress [Well Nourished] : well nourished [Well Developed] : well developed [Well-Appearing] : well-appearing [Normal Sclera/Conjunctiva] : normal sclera/conjunctiva [PERRL] : pupils equal round and reactive to light [EOMI] : extraocular movements intact [Normal Outer Ear/Nose] : the outer ears and nose were normal in appearance [Normal Oropharynx] : the oropharynx was normal [No JVD] : no jugular venous distention [Supple] : supple [No Lymphadenopathy] : no lymphadenopathy [Thyroid Normal, No Nodules] : the thyroid was normal and there were no nodules present [No Respiratory Distress] : no respiratory distress  [Clear to Auscultation] : lungs were clear to auscultation bilaterally [No Accessory Muscle Use] : no accessory muscle use [Normal Rate] : normal rate  [Regular Rhythm] : with a regular rhythm [Normal S1, S2] : normal S1 and S2 [No Murmur] : no murmur heard [No Carotid Bruits] : no carotid bruits [Normal Gait] : normal gait [Coordination Grossly Intact] : coordination grossly intact [No Focal Deficits] : no focal deficits [Deep Tendon Reflexes (DTR)] : deep tendon reflexes were 2+ and symmetric no fever and no chills. [Normal Affect] : the affect was normal [Normal Insight/Judgement] : insight and judgment were intact [de-identified] : trace edema b/l feet

## 2019-12-09 ENCOUNTER — APPOINTMENT (OUTPATIENT)
Dept: OTOLARYNGOLOGY | Facility: CLINIC | Age: 64
End: 2019-12-09
Payer: MEDICAID

## 2019-12-09 VITALS
WEIGHT: 196 LBS | TEMPERATURE: 98.5 F | BODY MASS INDEX: 32.65 KG/M2 | HEIGHT: 65 IN | HEART RATE: 78 BPM | SYSTOLIC BLOOD PRESSURE: 167 MMHG | DIASTOLIC BLOOD PRESSURE: 90 MMHG | OXYGEN SATURATION: 97 %

## 2019-12-09 PROCEDURE — 69210 REMOVE IMPACTED EAR WAX UNI: CPT

## 2019-12-09 PROCEDURE — 92557 COMPREHENSIVE HEARING TEST: CPT

## 2019-12-09 PROCEDURE — 99213 OFFICE O/P EST LOW 20 MIN: CPT | Mod: 25

## 2019-12-09 PROCEDURE — 92550 TYMPANOMETRY & REFLEX THRESH: CPT

## 2019-12-09 RX ORDER — FLUTICASONE PROPIONATE 50 UG/1
50 SPRAY, METERED NASAL
Qty: 1 | Refills: 5 | Status: ACTIVE | COMMUNITY
Start: 2019-12-09 | End: 1900-01-01

## 2019-12-09 NOTE — HISTORY OF PRESENT ILLNESS
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 Denies: ear pain, drainage, frequent loud noise exp/shooting, frequent infections, hx of ear surgery or IV antibiotics/chemo; denies a FHx of hearing loss.\par Also complains that both ears itch; uses qtips. \par Also c/o frequent nasal congestion & dryness especially first thing in the morning. No obvious allergies. Hasn't tried a nasal steroid. \par Lastly c/o dizziness w/o spinning when rising from a sitting position.

## 2019-12-09 NOTE — PHYSICAL EXAM
[Binocular Microscopic Exam] : Binocular microscopic exam was performed [] : septum deviated to the left [Normal] : no rashes [FreeTextEntry8] : cerumen impaction removed with a hook [FreeTextEntry9] : cerumen impaction removed with a hook [de-identified] : myringosclerosis & a deep, dry pars flaccida retraction the base of which isn't visible [de-identified] : myringosclerosis  [de-identified] : large ITs bilat

## 2019-12-09 NOTE — CONSULT LETTER
[Dear  ___] : Dear  [unfilled], [Consult Letter:] : I had the pleasure of evaluating your patient, [unfilled]. [Please see my note below.] : Please see my note below. [Consult Closing:] : Thank you very much for allowing me to participate in the care of this patient.  If you have any questions, please do not hesitate to contact me. [Sincerely,] : Sincerely, [FreeTextEntry3] : FLY Hartman Jr, MD, FAAOHNS\par Otolaryngologist\par [facility]

## 2019-12-10 ENCOUNTER — FORM ENCOUNTER (OUTPATIENT)
Age: 64
End: 2019-12-10

## 2019-12-11 ENCOUNTER — OUTPATIENT (OUTPATIENT)
Dept: OUTPATIENT SERVICES | Facility: HOSPITAL | Age: 64
LOS: 1 days | End: 2019-12-11

## 2019-12-11 ENCOUNTER — APPOINTMENT (OUTPATIENT)
Dept: CT IMAGING | Facility: CLINIC | Age: 64
End: 2019-12-11
Payer: MEDICAID

## 2019-12-11 DIAGNOSIS — Z98.891 HISTORY OF UTERINE SCAR FROM PREVIOUS SURGERY: Chronic | ICD-10-CM

## 2019-12-11 DIAGNOSIS — Z98.890 OTHER SPECIFIED POSTPROCEDURAL STATES: Chronic | ICD-10-CM

## 2019-12-11 DIAGNOSIS — Z41.9 ENCOUNTER FOR PROCEDURE FOR PURPOSES OTHER THAN REMEDYING HEALTH STATE, UNSPECIFIED: Chronic | ICD-10-CM

## 2019-12-11 PROCEDURE — 70480 CT ORBIT/EAR/FOSSA W/O DYE: CPT | Mod: 26

## 2020-03-09 ENCOUNTER — FORM ENCOUNTER (OUTPATIENT)
Age: 65
End: 2020-03-09

## 2020-03-10 ENCOUNTER — OUTPATIENT (OUTPATIENT)
Dept: OUTPATIENT SERVICES | Facility: HOSPITAL | Age: 65
LOS: 1 days | End: 2020-03-10
Payer: COMMERCIAL

## 2020-03-10 ENCOUNTER — APPOINTMENT (OUTPATIENT)
Dept: ORTHOPEDIC SURGERY | Facility: CLINIC | Age: 65
End: 2020-03-10
Payer: COMMERCIAL

## 2020-03-10 DIAGNOSIS — Z98.890 OTHER SPECIFIED POSTPROCEDURAL STATES: ICD-10-CM

## 2020-03-10 DIAGNOSIS — M25.562 PAIN IN LEFT KNEE: ICD-10-CM

## 2020-03-10 DIAGNOSIS — Z98.891 HISTORY OF UTERINE SCAR FROM PREVIOUS SURGERY: Chronic | ICD-10-CM

## 2020-03-10 DIAGNOSIS — Z41.9 ENCOUNTER FOR PROCEDURE FOR PURPOSES OTHER THAN REMEDYING HEALTH STATE, UNSPECIFIED: Chronic | ICD-10-CM

## 2020-03-10 DIAGNOSIS — Z98.890 OTHER SPECIFIED POSTPROCEDURAL STATES: Chronic | ICD-10-CM

## 2020-03-10 PROCEDURE — 73562 X-RAY EXAM OF KNEE 3: CPT

## 2020-03-10 PROCEDURE — 73562 X-RAY EXAM OF KNEE 3: CPT | Mod: 26,LT,RT

## 2020-03-10 PROCEDURE — 99024 POSTOP FOLLOW-UP VISIT: CPT

## 2020-03-11 NOTE — HISTORY OF PRESENT ILLNESS
[de-identified] : Savanna is a 64 year old Macedonian speaking female s/p left knee scope and partial meniscectomy 2/7/19 here for follow up. Patient complains of intermittent left knee pain on the medial side that feels like spasms with swelling. The pain and swelling has improved over the last 3 weeks. She is currently not taking any medications. Patient never had any injections in the left knee and never did physical therapy.  name: Sandoval ID#:[id number]

## 2020-03-11 NOTE — END OF VISIT
[FreeTextEntry3] : By signing my name below, I, Gypsy Schneider, attest that this documentation has been prepared under the direction and in the presence of Pipe Butler PA-C.\par

## 2020-03-11 NOTE — ASSESSMENT
[FreeTextEntry1] : Assessment/Plan\par \par S/p left knee scope and partial meniscectomy 2/7/19 \par \par Plan:\par Educated Patient on weight loss\par Educated patient on joint strengthening exercises \par Start physical therapy \par \par F/U in 3 months \par \par All medical record entries made by the PA/Pearl/Fellow are at my, Dr. Cipriano Kenney's direction and personally dictated by me on 03/10/2020]. I have reviewed the chart and agree that the record accurately reflects my personal performance of the history, physical exam, assessment, and plan. I have also personally directed reviewed, and agreed with the chart.\par \par

## 2020-03-11 NOTE — PHYSICAL EXAM
[de-identified] : General: Not in acute distress, dressed appropriately, sitting on examination table\par Skin: Warm and dry, normal turgor, no rashes\par Neurological: AOx3, Cranial nerves grossly in tact\par Psych: Mood and affect appropriate\par \par Right Knee: No swelling edema erythema redness or drainage. tender anteriorly. Alignment: Neutral ROM: 0-120 Stable. 5/5 Strength. DNVI. Ambulates without devices.\par \par Left Knee: Alignment: Neutral Tender: Medially ROM: 0-120 Stable 5/5 Strength. DNVI. Ambulates with a cane. \par \par \par \par \par  [de-identified] : X-ray of the left knee shows mild OA\par

## 2020-11-02 ENCOUNTER — APPOINTMENT (OUTPATIENT)
Dept: INTERNAL MEDICINE | Facility: CLINIC | Age: 65
End: 2020-11-02
Payer: MEDICARE

## 2020-11-02 VITALS
HEART RATE: 54 BPM | RESPIRATION RATE: 14 BRPM | DIASTOLIC BLOOD PRESSURE: 90 MMHG | OXYGEN SATURATION: 97 % | WEIGHT: 203 LBS | SYSTOLIC BLOOD PRESSURE: 140 MMHG | BODY MASS INDEX: 33.82 KG/M2 | TEMPERATURE: 98.4 F | HEIGHT: 65 IN

## 2020-11-02 DIAGNOSIS — Z00.00 ENCOUNTER FOR GENERAL ADULT MEDICAL EXAMINATION W/OUT ABNORMAL FINDINGS: ICD-10-CM

## 2020-11-02 DIAGNOSIS — R14.0 ABDOMINAL DISTENSION (GASEOUS): ICD-10-CM

## 2020-11-02 PROCEDURE — 99397 PER PM REEVAL EST PAT 65+ YR: CPT

## 2020-11-02 PROCEDURE — 99072 ADDL SUPL MATRL&STAF TM PHE: CPT

## 2020-11-02 PROCEDURE — 36415 COLL VENOUS BLD VENIPUNCTURE: CPT

## 2020-11-02 PROCEDURE — 93000 ELECTROCARDIOGRAM COMPLETE: CPT

## 2020-11-02 RX ORDER — NAPROXEN 500 MG/1
500 TABLET ORAL
Qty: 20 | Refills: 3 | Status: ACTIVE | COMMUNITY
Start: 2020-11-02 | End: 1900-01-01

## 2020-11-02 RX ORDER — HYDROCORTISONE 25 MG/G
2.5 CREAM TOPICAL 3 TIMES DAILY
Qty: 1 | Refills: 4 | Status: ACTIVE | COMMUNITY
Start: 2020-11-02 | End: 1900-01-01

## 2020-11-02 RX ORDER — ACYCLOVIR 50 MG/G
5 OINTMENT TOPICAL
Qty: 1 | Refills: 5 | Status: ACTIVE | COMMUNITY
Start: 2018-11-07 | End: 1900-01-01

## 2020-11-02 NOTE — HISTORY OF PRESENT ILLNESS
[de-identified] : 66 yo f with HTN\par Had flu vaccine already\par Has back pain lying in bed - for past 3 days..  , Has spent whole days in bed. Using heating pad.  Does not like to take medication  Advil helped a little.\par Has toruble walking with the pain\par Going to [facility] without issue.  \par Has some left ear pain.  \par And throat pain for 2 weeks,\par Reports skin splotches comes and goes, nothing currently- concerned about her labs. \par Colonoscopy 2015.  normal. Has hemorrhoids.  Uses a cream. \par Very dry eyes.  not taking anything.  \par Reports hand cramping  minutes - morning and evening.   - drinks water.

## 2020-11-02 NOTE — REVIEW OF SYSTEMS
[Joint Pain] : joint pain [Joint Stiffness] : joint stiffness [Back Pain] : back pain [Negative] : Psychiatric [FreeTextEntry3] : dry eyes [FreeTextEntry4] : throat pain [de-identified] : red splotche son her skin.

## 2020-11-02 NOTE — PHYSICAL EXAM
[No Edema] : there was no peripheral edema [No CVA Tenderness] : no CVA  tenderness [No Spinal Tenderness] : no spinal tenderness [Normal] : affect was normal and insight and judgment were intact [de-identified] : diffuse tenderness

## 2020-11-02 NOTE — PLAN
[FreeTextEntry1] : \par \par Hemorrhoids - will callgi doc for appt and f/up re need for colonoscopy\par Dry EYes- will make appt for ophtho\par Back pain - heating pad - trial of naproxen\par Abd pain send for ab and eplvic US - labs today\par ekg nsr\par labs today\par ENT referral for throat and ear pain

## 2020-11-03 LAB
25(OH)D3 SERPL-MCNC: 15.7 NG/ML
ALBUMIN SERPL ELPH-MCNC: 4.8 G/DL
ALP BLD-CCNC: 76 U/L
ALT SERPL-CCNC: 22 U/L
ANION GAP SERPL CALC-SCNC: 13 MMOL/L
APPEARANCE: ABNORMAL
AST SERPL-CCNC: 16 U/L
BASOPHILS # BLD AUTO: 0.06 K/UL
BASOPHILS NFR BLD AUTO: 0.9 %
BILIRUB SERPL-MCNC: 0.2 MG/DL
BILIRUBIN URINE: NEGATIVE
BLOOD URINE: NORMAL
BUN SERPL-MCNC: 19 MG/DL
CALCIUM SERPL-MCNC: 9.9 MG/DL
CHLORIDE SERPL-SCNC: 104 MMOL/L
CHOLEST SERPL-MCNC: 264 MG/DL
CO2 SERPL-SCNC: 22 MMOL/L
COLOR: YELLOW
CREAT SERPL-MCNC: 0.95 MG/DL
EOSINOPHIL # BLD AUTO: 0.07 K/UL
EOSINOPHIL NFR BLD AUTO: 1.1 %
ESTIMATED AVERAGE GLUCOSE: 114 MG/DL
GLUCOSE QUALITATIVE U: NEGATIVE
GLUCOSE SERPL-MCNC: 102 MG/DL
HBA1C MFR BLD HPLC: 5.6 %
HCT VFR BLD CALC: 48.4 %
HDLC SERPL-MCNC: 68 MG/DL
HGB BLD-MCNC: 15 G/DL
IMM GRANULOCYTES NFR BLD AUTO: 0.2 %
KETONES URINE: NEGATIVE
LDLC SERPL CALC-MCNC: 154 MG/DL
LEUKOCYTE ESTERASE URINE: ABNORMAL
LYMPHOCYTES # BLD AUTO: 2.08 K/UL
LYMPHOCYTES NFR BLD AUTO: 32.1 %
MAN DIFF?: NORMAL
MCHC RBC-ENTMCNC: 30.4 PG
MCHC RBC-ENTMCNC: 31 GM/DL
MCV RBC AUTO: 98.2 FL
MONOCYTES # BLD AUTO: 0.25 K/UL
MONOCYTES NFR BLD AUTO: 3.9 %
NEUTROPHILS # BLD AUTO: 4.01 K/UL
NEUTROPHILS NFR BLD AUTO: 61.8 %
NITRITE URINE: NEGATIVE
NONHDLC SERPL-MCNC: 197 MG/DL
PH URINE: 5
PLATELET # BLD AUTO: 251 K/UL
POTASSIUM SERPL-SCNC: 4.2 MMOL/L
PROT SERPL-MCNC: 7.2 G/DL
PROTEIN URINE: NORMAL
RBC # BLD: 4.93 M/UL
RBC # FLD: 13.4 %
SARS-COV-2 IGG SERPL IA-ACNC: 122 INDEX
SARS-COV-2 IGG SERPL QL IA: POSITIVE
SODIUM SERPL-SCNC: 139 MMOL/L
SPECIFIC GRAVITY URINE: 1.03
TRIGL SERPL-MCNC: 216 MG/DL
UROBILINOGEN URINE: NORMAL
WBC # FLD AUTO: 6.48 K/UL

## 2020-12-01 ENCOUNTER — APPOINTMENT (OUTPATIENT)
Dept: OTOLARYNGOLOGY | Facility: CLINIC | Age: 65
End: 2020-12-01
Payer: MEDICARE

## 2020-12-01 VITALS
DIASTOLIC BLOOD PRESSURE: 81 MMHG | BODY MASS INDEX: 33.82 KG/M2 | TEMPERATURE: 98 F | SYSTOLIC BLOOD PRESSURE: 144 MMHG | HEART RATE: 88 BPM | OXYGEN SATURATION: 96 % | WEIGHT: 203 LBS | HEIGHT: 65 IN

## 2020-12-01 DIAGNOSIS — H61.23 IMPACTED CERUMEN, BILATERAL: ICD-10-CM

## 2020-12-01 PROCEDURE — 31575 DIAGNOSTIC LARYNGOSCOPY: CPT

## 2020-12-01 PROCEDURE — 99072 ADDL SUPL MATRL&STAF TM PHE: CPT

## 2020-12-01 PROCEDURE — 92550 TYMPANOMETRY & REFLEX THRESH: CPT

## 2020-12-01 PROCEDURE — 92557 COMPREHENSIVE HEARING TEST: CPT

## 2020-12-01 PROCEDURE — 99214 OFFICE O/P EST MOD 30 MIN: CPT | Mod: 25

## 2020-12-01 PROCEDURE — 69210 REMOVE IMPACTED EAR WAX UNI: CPT

## 2020-12-01 RX ORDER — FLUTICASONE PROPIONATE 50 UG/1
50 SPRAY, METERED NASAL
Qty: 1 | Refills: 5 | Status: ACTIVE | COMMUNITY
Start: 2020-12-01 | End: 1900-01-01

## 2020-12-01 RX ORDER — FAMOTIDINE 20 MG/1
20 TABLET, FILM COATED ORAL
Qty: 60 | Refills: 5 | Status: ACTIVE | COMMUNITY
Start: 2020-12-01 | End: 1900-01-01

## 2020-12-01 NOTE — ASSESSMENT
[FreeTextEntry1] : Again Discussed the importance not putting qtips or other foreign objects in the ears.\par Reinforced behavioral modification as previously discussed.\par Otology referral for her chronic otomastoiditis & MHL

## 2020-12-01 NOTE — DATA REVIEWED
[de-identified] : 12/19: L mod-sev HFSNHL; R mod-sev MHL. type A AS, B AD. WRS 72% AD, 90% AS\par today: L  mild to mod SNHL, R prof to severe MHL. Type A AS, B AD [de-identified] : CT t-bones 12/19: chronic R otomastoiditis, no erosion to allow dx of cholesteatoma

## 2020-12-01 NOTE — HISTORY OF PRESENT ILLNESS
[de-identified] : Angolan speakers, office staff translated per the patient's request\par Lost to f/u a year ago and now returns w/ hx of R sided hearing loss that developed gradually starting several years ago; also with worsening hearing on the L. Some L sided nonpulsatile tinnitus. Denies: ear pain, drainage, frequent loud noise exp/shooting, frequent infections, hx of ear surgery or IV antibiotics/chemo; denies a FHx of hearing loss.\par Also complains that both ears itch; still uses qtips. \par Also c/o frequent nasal congestion & dryness especially first thing in the morning. No obvious allergies. Fluticasone helped but she stopped using it. \par Today she also c/o a FB sensation in her throat for the last 5 months; no hoarseness or dysphagia. Occas nocturnal heartburn. \par \par Covid-19 screening questions: she doesn't think she had it but had pos Ab 11/20

## 2020-12-01 NOTE — PHYSICAL EXAM
[] : septum deviated to the left [Binocular Microscopic Exam] : Binocular microscopic exam was performed [Laryngoscopy Performed] : laryngoscopy was performed, see procedure section for findings [Normal] : inferior turbinates and middle turbinates are normal [FreeTextEntry8] : cerumen impaction removed with a hook [FreeTextEntry9] : cerumen impaction removed with a hook [de-identified] : heavy myringosclerosis & a deep, dry pars flaccida retraction the base of which isn't visible; unchanged [de-identified] : myringosclerosis

## 2020-12-16 PROBLEM — Z87.09 HISTORY OF SORE THROAT: Status: RESOLVED | Noted: 2018-08-30 | Resolved: 2020-12-16

## 2021-01-07 ENCOUNTER — APPOINTMENT (OUTPATIENT)
Dept: OTOLARYNGOLOGY | Facility: CLINIC | Age: 66
End: 2021-01-07
Payer: MEDICARE

## 2021-01-07 VITALS
DIASTOLIC BLOOD PRESSURE: 85 MMHG | SYSTOLIC BLOOD PRESSURE: 154 MMHG | BODY MASS INDEX: 33.82 KG/M2 | OXYGEN SATURATION: 98 % | HEIGHT: 65 IN | TEMPERATURE: 97.6 F | HEART RATE: 63 BPM | WEIGHT: 203 LBS

## 2021-01-07 DIAGNOSIS — J34.3 HYPERTROPHY OF NASAL TURBINATES: ICD-10-CM

## 2021-01-07 PROCEDURE — 99072 ADDL SUPL MATRL&STAF TM PHE: CPT

## 2021-01-07 PROCEDURE — 99214 OFFICE O/P EST MOD 30 MIN: CPT | Mod: 25

## 2021-01-07 RX ORDER — OMEPRAZOLE 20 MG/1
20 CAPSULE, DELAYED RELEASE ORAL TWICE DAILY
Qty: 60 | Refills: 5 | Status: ACTIVE | COMMUNITY
Start: 2021-01-07 | End: 1900-01-01

## 2021-01-07 NOTE — HISTORY OF PRESENT ILLNESS
[de-identified] : Pashto speakers, office staff translated per the patient's request\par Followed w/ a hx of R sided hearing loss that developed gradually starting several years ago; also with worsening hearing on the L. Some L sided nonpulsatile tinnitus. No changes. Denies: ear pain, drainage, frequent loud noise exp/shooting, frequent infections, hx of ear surgery or IV antibiotics/chemo; denies a FHx of hearing loss. Hasn't seen otology yet. \par Also c/o frequent nasal congestion & dryness especially first thing in the morning. No obvious allergies. Fluticasone seems to be helping. \par She also f/u a FB sensation in her throat for the last 5 months; no hoarseness or dysphagia. Occas nocturnal heartburn. She returns today w/ ongoing sxs & was unable to take the famotidine bcz it seemed to make her throat very itchy 10 min after taking it. Following reflux precns. \par \par Covid-19 screening questions: she doesn't think she had it but had pos Ab 11/20

## 2021-01-07 NOTE — DATA REVIEWED
[de-identified] : 12/19: L mod-sev HFSNHL; R mod-sev MHL. type A AS, B AD. WRS 72% AD, 90% AS\par 12/20: L  mild to mod SNHL, R prof to severe MHL. Type A AS, B AD [de-identified] : CT t-bones 12/19: chronic R otomastoiditis, no erosion to allow dx of cholesteatoma

## 2021-01-07 NOTE — ASSESSMENT
[FreeTextEntry1] : Switch to omeprazole d/t possible famotidine allergy. RTC 6 wks sooner prn\par Encouraged ongoing reflux precautions & an otology eval\par

## 2021-01-07 NOTE — PHYSICAL EXAM
[] : septum deviated to the left [Binocular Microscopic Exam] : Binocular microscopic exam was performed [Normal] : the left external canal was normal [de-identified] : heavy myringosclerosis & a deep, dry pars flaccida retraction the base of which isn't visible; unchanged [de-identified] : myringosclerosis

## 2021-02-18 ENCOUNTER — APPOINTMENT (OUTPATIENT)
Dept: OTOLARYNGOLOGY | Facility: CLINIC | Age: 66
End: 2021-02-18

## 2021-03-03 ENCOUNTER — RX RENEWAL (OUTPATIENT)
Age: 66
End: 2021-03-03

## 2021-03-25 ENCOUNTER — APPOINTMENT (OUTPATIENT)
Dept: OTOLARYNGOLOGY | Facility: CLINIC | Age: 66
End: 2021-03-25
Payer: MEDICARE

## 2021-03-25 VITALS
WEIGHT: 203 LBS | SYSTOLIC BLOOD PRESSURE: 135 MMHG | OXYGEN SATURATION: 96 % | HEART RATE: 77 BPM | DIASTOLIC BLOOD PRESSURE: 78 MMHG | TEMPERATURE: 97.8 F | BODY MASS INDEX: 33.82 KG/M2 | HEIGHT: 65 IN

## 2021-03-25 DIAGNOSIS — R22.0 LOCALIZED SWELLING, MASS AND LUMP, HEAD: ICD-10-CM

## 2021-03-25 DIAGNOSIS — J34.2 DEVIATED NASAL SEPTUM: ICD-10-CM

## 2021-03-25 DIAGNOSIS — H90.A31 MIXED CONDUCTIVE AND SENSORINEURAL HEARING LOSS, UNILATERAL, RIGHT EAR WITH RESTRICTED HEARING ON THE  CONTRALATERAL SIDE: ICD-10-CM

## 2021-03-25 DIAGNOSIS — K21.9 GASTRO-ESOPHAGEAL REFLUX DISEASE W/OUT ESOPHAGITIS: ICD-10-CM

## 2021-03-25 DIAGNOSIS — H93.12 TINNITUS, LEFT EAR: ICD-10-CM

## 2021-03-25 PROCEDURE — 31575 DIAGNOSTIC LARYNGOSCOPY: CPT

## 2021-03-25 PROCEDURE — 99072 ADDL SUPL MATRL&STAF TM PHE: CPT

## 2021-03-25 PROCEDURE — 99214 OFFICE O/P EST MOD 30 MIN: CPT | Mod: 25

## 2021-03-25 NOTE — HISTORY OF PRESENT ILLNESS
[de-identified] : Bulgarian speakers, office staff translated per the patient's request\par Followed w/ a hx of R sided hearing loss that developed gradually starting several years ago; also with worsening hearing on the L. Some L sided nonpulsatile tinnitus. No changes- she saw otology @ [facility] ([facility]) and had a CT (results unk.). Denies: ear pain, drainage, frequent loud noise exp/shooting, frequent infections, hx of ear surgery or IV antibiotics/chemo; denies a FHx of hearing loss. \par Also c/o frequent nasal congestion & dryness especially first thing in the morning. No obvious allergies. Fluticasone seems to be helping significantly. \par She also f/u a FB sensation in her throat for the last 6 months which is improved; no hoarseness or dysphagia. Much improved reflux sxs despite stopping the PPI sev wks ago. Following reflux precns. \par Lastly she c/o a bump on her posterior L tongue that felt irritated last week. \par \par Covid-19 screening questions: she doesn't think she had it but had pos Ab 11/20

## 2021-03-25 NOTE — PROCEDURE
[de-identified] : We discussed the elevated risk of liberation of viral particles from the nasopharynx if the patient were to be asymptomatically infected with COVID-19; after weighing the risks & benefits the decision was made to proceed. The procedure was performed while wearing appropriate PPE and a camera attached to a video system was used to maximize separation from the patient. The scope was handled appropriately. \par Indication: requirement for exam not possible via anterior examination; globus & tongue mass\par After verbal consent and the conservative administration of an aerosolized phenylephrine/lidocaine mix, examination was performed with a flexible endoscope placed through the nose. Findings:\par Nasopharynx: unremarkable\par Soft palate, lateral and posterior pharyngeal walls: unremarkable\par Base of tongue & lingual tonsil: minimal retrodisplacement- nodule not vis\par Vallecula: unremarkable\par Epiglottis: unremarkable\par Piriform sinuses and pharyngoesophageal junction: unremarkable\par Arytenoids and AE folds: severe interarytenoid edema\par Ventricle and false vocal folds: unremarkable\par True vocal folds: Smooth free edge; surface without ectasias or edema; normal movement bilaterally with good apposition in phonation\par Visible subglottis: unremarkable\par Other findings: ELM

## 2021-03-25 NOTE — ASSESSMENT
[FreeTextEntry1] : # tongue nodule: appears benign, reassured her; recheck in 3 wks sooner prn\par \par # LPR: discussed her intermittent med compliance & Reinforced behavioral modification as previously discussed.\par \par # tinnitus/hearing loss: she expresses dissatisfaction with her visit to [facility]; encouraged her to follow up on a CT t-bones that was apparently done there

## 2021-03-25 NOTE — DATA REVIEWED
[de-identified] : 12/19: L mod-sev HFSNHL; R mod-sev MHL. type A AS, B AD. WRS 72% AD, 90% AS\par 12/20: L  mild to mod SNHL, R prof to severe MHL. Type A AS, B AD [de-identified] : CT t-bones 12/19: chronic R otomastoiditis, no erosion to allow dx of cholesteatoma

## 2021-03-25 NOTE — PHYSICAL EXAM
[] : septum deviated to the left [Normal] : the left middle ear was normal [Laryngoscopy Performed] : laryngoscopy was performed, see procedure section for findings [de-identified] : 2-3 mm purplish nodule L post tongue [de-identified] : heavy myringosclerosis & a deep, dry pars flaccida retraction the base of which isn't visible; unchanged [de-identified] : myringosclerosis

## 2021-10-06 PROBLEM — I10 ESSENTIAL HYPERTENSION: Status: ACTIVE | Noted: 2017-08-11

## 2021-12-23 ENCOUNTER — RX RENEWAL (OUTPATIENT)
Age: 66
End: 2021-12-23

## 2021-12-23 RX ORDER — LISINOPRIL AND HYDROCHLOROTHIAZIDE TABLETS 20; 25 MG/1; MG/1
20-25 TABLET ORAL
Qty: 90 | Refills: 0 | Status: ACTIVE | COMMUNITY
Start: 2018-10-10 | End: 1900-01-01

## 2023-08-01 NOTE — PROCEDURE
Nurse's note reviewed and agree.    Ms. Stafford is here for follow up of chronic medical problems including *** and diabetes. she has been taking her medication as directed without difficulty and reports *** side effects. she {HAS/HAS NOT:196841::\"has\"} been monitoring his sugars at home regularly. Home readings are running in the 107-160s range. 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   • Chronic pain     back, legs   • Complex tear of medial meniscus of left knee as current injury 05/10/2019   • Cubital tunnel syndrome on right 2016   • Gastritis    • Gastroesophageal reflux disease    • Gout 2008   • Hyperlipidemia 03/01/2017   • Hypertension 1975   • Idiopathic peripheral neuropathy 05/11/2015   • Left renal mass 11/02/2015    1 cm lower pole mass (vs cyst?)   • Malignant neoplasm (CMD)     [facility]   • Nephrolithiasis 06/02/2017   • Ovarian mass     Surgery will be 11/11/10,benign   • Pelvic mass 12/31/2010   • Peripheral neuropathy    • PVC's (premature ventricular contractions) 10/28/2020   • Status post CVA 01/13/2021    x 3 since around Christopher vision in right blurry   • Tachycardia    • Type 2 diabetes mellitus with morbid obesity (CMD) 06/04/2021       Current medications are reviewed and include:  Current Outpatient Medications   Medication Sig Dispense Refill   • spironolactone (ALDACTONE) 25 MG tablet TAKE ONE TABLET BY MOUTH DAILY 90 tablet 1   • magnesium chloride 64 MG Tablet Enteric Coated deleayed release tablet Take 1 tablet by mouth in the morning and 1 tablet at noon and 1 tablet in the evening. Take with meals. 180  tablet 0   • pregabalin (LYRICA) 100 MG capsule TAKE ONE CAPSULE BY MOUTH THREE TIMES A DAY ( IN THE MORNING AND AT NOON AND IN THE EVENING ) . 90 capsule 3   • metFORMIN (GLUCOPHAGE-XR) 500 MG 24 hr tablet TAKE TWO TABLETS BY MOUTH DAILY 180 tablet 0   • metoPROLOL succinate (TOPROL-XL) 100 MG 24 hr tablet TAKE ONE TABLET BY MOUTH DAILY 90 tablet 0   • amitriptyline (ELAVIL) 10 MG tablet TAKE ONE TABLET BY MOUTH EVERY EVENING. 90 tablet 1   • clopidogrel (PLAVIX) 75 MG tablet TAKE ONE TABLET BY MOUTH DAILY 90 tablet 1   • candesartan (ATACAND) 16 MG tablet TAKE ONE TABLET BY MOUTH DAILY 90 tablet 1   • rosuvastatin (CRESTOR) 40 MG tablet TAKE ONE TABLET BY MOUTH DAILY 90 tablet 1   • citalopram (CeleXA) 20 MG tablet TAKE ONE TABLET BY MOUTH DAILY 90 tablet 3   • estradiol (ESTRACE) 1 MG tablet Take 1 tablet by mouth daily. 90 tablet 1   • glipiZIDE (GLUCOTROL) 10 MG tablet TAKE ONE TABLET BY MOUTH IN THE MORNING AND TAKE ONE TABLET IN THE EVENING BEFORE MEALS 180 tablet 3   • blood glucose (True Metrix Blood Glucose Test) test strip 2 times daily. Test blood sugar 2 times daily. Diagnosis: E11.40. 200 strip 3   • Probiotic Product (PROBIOTIC DAILY PO)      • ipratropium (ATROVENT) 0.03 % nasal spray Spray 2 sprays in each nostril every 12 hours. (Patient taking differently: Spray 2 sprays in each nostril as needed.) 30 mL 12   • Multiple Vitamins-Minerals (CENTRUM SILVER 50+WOMEN) Tab Take 1 tablet by mouth daily.      • albuterol (PROAIR HFA) 108 (90 BASE) MCG/ACT inhaler Inhale 2 puffs into the lungs every 4 hours as needed for Shortness of Breath or Wheezing. 1 Inhaler 0   • Blood Glucose Monitoring Suppl (TRUE METRIX METER) Device 1 Device 4 times daily. 1 Device 0   • Lancets (VITALET 26 GAUGE LANCET) [facility] Dispense true metrix lancets 200 each 6   • Cholecalciferol (VITAMIN D) 2000 UNITS CAPS Take 2,000 Units by mouth daily.        No current facility-administered medications for this visit.       Social  History     Tobacco Use   • Smoking status: Never   • Smokeless tobacco: Never   Substance Use Topics   • Alcohol use: No     Alcohol/week: 0.0 standard drinks of alcohol       Diet/exercise: {HAS/HAS NOT:626858::\"Has been exercising regularly.\"}, {wt loss has/not:908735::\"Has not lost weight.\"}, {HAS/HAS NOT:145134::\"has\"} been following her diabetic diet.    Review of Systems: Patient denies  {DM ROS:499689::\"headache\",\"polydipsia\",\"polyuria\",\"chest pain\",\"shortness of breath\",\"ankle swelling\",\"foot pain\",\"paresthesias\",\"numbness\",\"nausea or vomiting\"}    Recent labs are reviewed and are as follows:  Last A1c was   Hemoglobin A1C (%)   Date Value   07/20/2023 7.0 (H)     Last creatinine was   Creatinine (mg/dL)   Date Value   12/05/2022 1.01 (H)     Last microalbumin was   MICROALBUMIN, UA (TTL) (mg/dL)   Date Value   04/04/2019 86.90     Last cholesterol was   Cholesterol (mg/dL)   Date Value   07/20/2023 140     HDL (mg/dL)   Date Value   07/20/2023 36 (L)     Triglycerides (mg/dL)   Date Value   07/20/2023 438 (H)     LDL (mg/dL)   Date Value   12/05/2022 106     Last TSH was   TSH (mcUnits/mL)   Date Value   06/16/2022 4.418       Last eye exam was ***  Last foot check was ***    Exam:   Blood pressure 124/70, pulse (!) 106, weight 101.2 kg (223 lb), SpO2 97 %.  LUNGS:  No respiratory distress.  Chest symmetric with normal anterior/posterior diameter.  No chest deformities noted.  Lungs clear to auscultation bilaterally.  No wheezes, rales, or rhonchi.  HEART:  Regular rate and rhythm.  S1 normal, S2 normal.  No S3, S4.  No murmurs, clicks, or gallops.  EXTREMITIES:  Normal.  No cyanosis, clubbing, and no edema.  FOOT: A microfilament exam {WAS, WAS NOT:760812::\"was\"} done. Sensation intact    [de-identified] : We discussed the elevated risk of liberation of viral particles from the nasopharynx if the patient were to be asymptomatically infected with COVID-19; after weighing the risks & benefits the decision was made to proceed. The procedure was performed while wearing appropriate PPE and a camera attached to a video system was used to maximize separation from the patient. The scope was handled appropriately. \par Indication: requirement for exam not possible via anterior examination; globus\par After verbal consent and the conservative administration of an aerosolized phenylephrine/lidocaine mix, examination was performed with a flexible endoscope placed through the nose. Findings:\par Nasopharynx: unremarkable\par Soft palate, lateral and posterior pharyngeal walls: unremarkable\par Base of tongue & lingual tonsil: minimal retrodisplacement\par Vallecula: unremarkable\par Epiglottis: unremarkable\par Piriform sinuses and pharyngoesophageal junction: unremarkable\par Arytenoids and AE folds: severe interarytenoid edema\par Ventricle and false vocal folds: unremarkable\par True vocal folds: Smooth free edge; surface without ectasias or edema; normal movement bilaterally with good apposition in phonation\par Visible subglottis: unremarkable\par Other findings: ELM